# Patient Record
Sex: MALE | Race: WHITE
[De-identification: names, ages, dates, MRNs, and addresses within clinical notes are randomized per-mention and may not be internally consistent; named-entity substitution may affect disease eponyms.]

---

## 2018-03-30 ENCOUNTER — HOSPITAL ENCOUNTER (OUTPATIENT)
Dept: HOSPITAL 58 - FCC-LAB | Age: 38
Discharge: HOME | End: 2018-03-30
Attending: FAMILY MEDICINE

## 2018-03-30 DIAGNOSIS — Z11.3: ICD-10-CM

## 2018-03-30 DIAGNOSIS — L98.9: Primary | ICD-10-CM

## 2018-03-30 DIAGNOSIS — L03.90: ICD-10-CM

## 2018-03-30 DIAGNOSIS — Z83.3: ICD-10-CM

## 2018-03-30 DIAGNOSIS — G89.29: ICD-10-CM

## 2018-03-30 DIAGNOSIS — M54.5: ICD-10-CM

## 2018-03-30 DIAGNOSIS — F14.90: ICD-10-CM

## 2018-03-30 DIAGNOSIS — M25.571: ICD-10-CM

## 2018-03-30 PROCEDURE — 36415 COLL VENOUS BLD VENIPUNCTURE: CPT

## 2018-03-30 PROCEDURE — 85025 COMPLETE CBC W/AUTO DIFF WBC: CPT

## 2018-03-30 PROCEDURE — 80053 COMPREHEN METABOLIC PANEL: CPT

## 2018-03-30 PROCEDURE — 87800 DETECT AGNT MULT DNA DIREC: CPT

## 2018-03-30 PROCEDURE — 86701 HIV-1ANTIBODY: CPT

## 2018-03-30 PROCEDURE — 86592 SYPHILIS TEST NON-TREP QUAL: CPT

## 2018-04-04 ENCOUNTER — HOSPITAL ENCOUNTER (OUTPATIENT)
Dept: HOSPITAL 58 - RAD | Age: 38
Discharge: HOME | End: 2018-04-04
Attending: FAMILY MEDICINE

## 2018-04-04 DIAGNOSIS — M25.571: Primary | ICD-10-CM

## 2018-04-04 DIAGNOSIS — G89.29: ICD-10-CM

## 2018-04-04 DIAGNOSIS — M54.5: ICD-10-CM

## 2018-04-04 NOTE — DI
EXAM:  Lumbar spine five views 

  

HISTORY:  Low back pain 

  

COMPARISON:  None 

  

TECHNIQUE:  Five views lumbar spine were performed including oblique views 

  

FINDINGS:  Sacroiliac joints intact.  Sacral arcuate intact. Minimal age indeterminate compression de
formity L1. No subluxation. Small multilevel marginal osteophytes.  Intervertebral spaces maintained.
  Mild multilevel facet arthrosis. 

  

IMPRESSION: 

1.  Minimal age determined compression deformity L1. 

2.  Mild chronic discogenic degenerative disease and facet arthrosis.

## 2018-04-04 NOTE — DI
Exam:  Four x-rays of the right ankle. 

  

Comparison:  None available. 

  

Reason for exam:  Pain in right ankle. 

  

FINDINGS:  Orthopedic screw is seen in the right talus.  The screw head is not seen. No acute fractur
e or dislocation.  The talar dome is intact.  There is no abnormal widening of the medial or lateral 
clear space.  Degenerative disease is seen with osteophyte formation. 

  

Impression: 

  

1.  Screw fixation of the talus without evidence of periprosthetic fracture.  The right screw heading
 is not seen on this examination and may have been fractured off.  Recommend comparison with previous
 imaging. 

2.  No acute fracture or dislocation in the right ankle with moderate degenerative disease

## 2018-05-17 ENCOUNTER — HOSPITAL ENCOUNTER (OUTPATIENT)
Dept: HOSPITAL 58 - FCC-LAB | Age: 38
Discharge: HOME | End: 2018-05-17
Attending: FAMILY MEDICINE

## 2018-05-17 DIAGNOSIS — R60.9: Primary | ICD-10-CM

## 2018-05-17 PROCEDURE — 85025 COMPLETE CBC W/AUTO DIFF WBC: CPT

## 2018-05-17 PROCEDURE — 36415 COLL VENOUS BLD VENIPUNCTURE: CPT

## 2018-05-17 PROCEDURE — 83880 ASSAY OF NATRIURETIC PEPTIDE: CPT

## 2018-05-17 PROCEDURE — 84443 ASSAY THYROID STIM HORMONE: CPT

## 2018-05-17 PROCEDURE — 81001 URINALYSIS AUTO W/SCOPE: CPT

## 2018-05-17 PROCEDURE — 80053 COMPREHEN METABOLIC PANEL: CPT

## 2018-06-19 ENCOUNTER — HOSPITAL ENCOUNTER (OUTPATIENT)
Dept: HOSPITAL 58 - CAR | Age: 38
Discharge: HOME | End: 2018-06-19
Attending: FAMILY MEDICINE

## 2018-06-19 DIAGNOSIS — R60.9: Primary | ICD-10-CM
